# Patient Record
Sex: MALE | Race: ASIAN | Employment: OTHER | URBAN - METROPOLITAN AREA
[De-identification: names, ages, dates, MRNs, and addresses within clinical notes are randomized per-mention and may not be internally consistent; named-entity substitution may affect disease eponyms.]

---

## 2017-09-15 ENCOUNTER — HOSPITAL ENCOUNTER (OUTPATIENT)
Age: 77
Setting detail: OBSERVATION
Discharge: HOME | End: 2017-09-16
Attending: INTERNAL MEDICINE | Admitting: INTERNAL MEDICINE
Payer: OTHER GOVERNMENT

## 2017-09-15 ENCOUNTER — APPOINTMENT (OUTPATIENT)
Dept: GENERAL RADIOLOGY | Age: 77
End: 2017-09-15
Payer: OTHER GOVERNMENT

## 2017-09-15 PROBLEM — I48.92 ATRIAL FLUTTER (HCC): Status: ACTIVE | Noted: 2017-09-15

## 2017-09-15 PROBLEM — R00.1 BRADYCARDIA: Status: ACTIVE | Noted: 2017-09-15

## 2017-09-15 PROBLEM — I10 HTN (HYPERTENSION): Status: ACTIVE | Noted: 2017-09-15

## 2017-09-15 PROBLEM — I25.10 CAD (CORONARY ARTERY DISEASE): Status: ACTIVE | Noted: 2017-09-15

## 2017-09-15 PROBLEM — Z95.1 HX OF CABG: Status: ACTIVE | Noted: 2017-09-15

## 2017-09-15 PROBLEM — R55 NEAR SYNCOPE: Status: ACTIVE | Noted: 2017-09-15

## 2017-09-15 PROBLEM — E11.9 DM (DIABETES MELLITUS) (HCC): Status: ACTIVE | Noted: 2017-09-15

## 2017-09-15 LAB
ALBUMIN SERPL-MCNC: 3.2 G/DL (ref 3.2–4.6)
ALBUMIN/GLOB SERPL: 0.7 {RATIO} (ref 1.2–3.5)
ALP SERPL-CCNC: 76 U/L (ref 50–136)
ALT SERPL-CCNC: 43 U/L (ref 12–65)
ANION GAP SERPL CALC-SCNC: 9 MMOL/L (ref 7–16)
AST SERPL-CCNC: 40 U/L (ref 15–37)
ATRIAL RATE: 227 BPM
ATRIAL RATE: 227 BPM
BASOPHILS # BLD: 0 K/UL (ref 0–0.2)
BASOPHILS NFR BLD: 0 % (ref 0–2)
BILIRUB SERPL-MCNC: 1 MG/DL (ref 0.2–1.1)
BUN SERPL-MCNC: 39 MG/DL (ref 8–23)
CALCIUM SERPL-MCNC: 9 MG/DL (ref 8.3–10.4)
CALCULATED P AXIS, ECG09: -103 DEGREES
CALCULATED R AXIS, ECG10: 59 DEGREES
CALCULATED R AXIS, ECG10: 71 DEGREES
CALCULATED T AXIS, ECG11: 43 DEGREES
CALCULATED T AXIS, ECG11: 67 DEGREES
CHLORIDE SERPL-SCNC: 102 MMOL/L (ref 98–107)
CO2 SERPL-SCNC: 27 MMOL/L (ref 21–32)
CREAT SERPL-MCNC: 1.67 MG/DL (ref 0.8–1.5)
DIAGNOSIS, 93000: NORMAL
DIAGNOSIS, 93000: NORMAL
DIFFERENTIAL METHOD BLD: ABNORMAL
EOSINOPHIL # BLD: 0.3 K/UL (ref 0–0.8)
EOSINOPHIL NFR BLD: 3 % (ref 0.5–7.8)
ERYTHROCYTE [DISTWIDTH] IN BLOOD BY AUTOMATED COUNT: 13.4 % (ref 11.9–14.6)
GLOBULIN SER CALC-MCNC: 4.4 G/DL (ref 2.3–3.5)
GLUCOSE BLD STRIP.AUTO-MCNC: 133 MG/DL (ref 65–100)
GLUCOSE BLD STRIP.AUTO-MCNC: 195 MG/DL (ref 65–100)
GLUCOSE SERPL-MCNC: 146 MG/DL (ref 65–100)
HCT VFR BLD AUTO: 38.9 % (ref 41.1–50.3)
HGB BLD-MCNC: 13.4 G/DL (ref 13.6–17.2)
IMM GRANULOCYTES # BLD: 0.1 K/UL (ref 0–0.5)
IMM GRANULOCYTES NFR BLD: 0.6 % (ref 0–5)
LYMPHOCYTES # BLD: 1.4 K/UL (ref 0.5–4.6)
LYMPHOCYTES NFR BLD: 14 % (ref 13–44)
MCH RBC QN AUTO: 30.5 PG (ref 26.1–32.9)
MCHC RBC AUTO-ENTMCNC: 34.4 G/DL (ref 31.4–35)
MCV RBC AUTO: 88.6 FL (ref 79.6–97.8)
MONOCYTES # BLD: 0.6 K/UL (ref 0.1–1.3)
MONOCYTES NFR BLD: 6 % (ref 4–12)
NEUTS SEG # BLD: 7.9 K/UL (ref 1.7–8.2)
NEUTS SEG NFR BLD: 76 % (ref 43–78)
PLATELET # BLD AUTO: 233 K/UL (ref 150–450)
PMV BLD AUTO: 11.2 FL (ref 10.8–14.1)
POTASSIUM SERPL-SCNC: 4.2 MMOL/L (ref 3.5–5.1)
PROT SERPL-MCNC: 7.6 G/DL (ref 6.3–8.2)
Q-T INTERVAL, ECG07: 394 MS
Q-T INTERVAL, ECG07: 432 MS
QRS DURATION, ECG06: 84 MS
QRS DURATION, ECG06: 86 MS
QTC CALCULATION (BEZET), ECG08: 390 MS
QTC CALCULATION (BEZET), ECG08: 409 MS
RBC # BLD AUTO: 4.39 M/UL (ref 4.23–5.67)
SODIUM SERPL-SCNC: 138 MMOL/L (ref 136–145)
TROPONIN I SERPL-MCNC: 0.13 NG/ML (ref 0.02–0.05)
VENTRICULAR RATE, ECG03: 54 BPM
VENTRICULAR RATE, ECG03: 59 BPM
WBC # BLD AUTO: 10.4 K/UL (ref 4.3–11.1)

## 2017-09-15 PROCEDURE — 94760 N-INVAS EAR/PLS OXIMETRY 1: CPT | Performed by: INTERNAL MEDICINE

## 2017-09-15 PROCEDURE — 82962 GLUCOSE BLOOD TEST: CPT

## 2017-09-15 PROCEDURE — 85025 COMPLETE CBC W/AUTO DIFF WBC: CPT | Performed by: PHYSICIAN ASSISTANT

## 2017-09-15 PROCEDURE — 99218 HC RM OBSERVATION: CPT

## 2017-09-15 PROCEDURE — 93005 ELECTROCARDIOGRAM TRACING: CPT | Performed by: PHYSICIAN ASSISTANT

## 2017-09-15 PROCEDURE — 99284 EMERGENCY DEPT VISIT MOD MDM: CPT | Performed by: INTERNAL MEDICINE

## 2017-09-15 PROCEDURE — 74011636637 HC RX REV CODE- 636/637: Performed by: NURSE PRACTITIONER

## 2017-09-15 PROCEDURE — 71020 XR CHEST PA LAT: CPT

## 2017-09-15 PROCEDURE — 80053 COMPREHEN METABOLIC PANEL: CPT | Performed by: PHYSICIAN ASSISTANT

## 2017-09-15 PROCEDURE — 84484 ASSAY OF TROPONIN QUANT: CPT | Performed by: PHYSICIAN ASSISTANT

## 2017-09-15 PROCEDURE — 93005 ELECTROCARDIOGRAM TRACING: CPT | Performed by: INTERNAL MEDICINE

## 2017-09-15 RX ORDER — GUAIFENESIN/PHENYLPROPANOLAMIN
450 EXPECTORANT ORAL DAILY
COMMUNITY

## 2017-09-15 RX ORDER — INSULIN LISPRO 100 [IU]/ML
INJECTION, SOLUTION INTRAVENOUS; SUBCUTANEOUS
Status: DISCONTINUED | OUTPATIENT
Start: 2017-09-15 | End: 2017-09-16 | Stop reason: HOSPADM

## 2017-09-15 RX ORDER — FUROSEMIDE 40 MG/1
40 TABLET ORAL DAILY
COMMUNITY

## 2017-09-15 RX ORDER — INSULIN ASPART 100 [IU]/ML
4 INJECTION, SOLUTION INTRAVENOUS; SUBCUTANEOUS DAILY
COMMUNITY

## 2017-09-15 RX ORDER — TAMSULOSIN HYDROCHLORIDE 0.4 MG/1
0.4 CAPSULE ORAL DAILY
Status: DISCONTINUED | OUTPATIENT
Start: 2017-09-16 | End: 2017-09-16 | Stop reason: HOSPADM

## 2017-09-15 RX ORDER — ALBUTEROL SULFATE 90 UG/1
2 AEROSOL, METERED RESPIRATORY (INHALATION)
COMMUNITY

## 2017-09-15 RX ORDER — SODIUM CHLORIDE 0.9 % (FLUSH) 0.9 %
5-10 SYRINGE (ML) INJECTION EVERY 8 HOURS
Status: DISCONTINUED | OUTPATIENT
Start: 2017-09-15 | End: 2017-09-16 | Stop reason: HOSPADM

## 2017-09-15 RX ORDER — RANITIDINE HCL 75 MG
75 TABLET ORAL 2 TIMES DAILY
COMMUNITY

## 2017-09-15 RX ORDER — SODIUM CHLORIDE 0.9 % (FLUSH) 0.9 %
5-10 SYRINGE (ML) INJECTION AS NEEDED
Status: DISCONTINUED | OUTPATIENT
Start: 2017-09-15 | End: 2017-09-16 | Stop reason: HOSPADM

## 2017-09-15 RX ORDER — GLUCOSAMINE SULFATE 1500 MG
1000 POWDER IN PACKET (EA) ORAL DAILY
COMMUNITY

## 2017-09-15 RX ORDER — GUAIFENESIN 100 MG/5ML
81 LIQUID (ML) ORAL DAILY
Status: DISCONTINUED | OUTPATIENT
Start: 2017-09-16 | End: 2017-09-15 | Stop reason: SDUPTHER

## 2017-09-15 RX ORDER — METOPROLOL TARTRATE 25 MG/1
25 TABLET, FILM COATED ORAL DAILY
COMMUNITY
End: 2017-09-16

## 2017-09-15 RX ORDER — GUAIFENESIN 100 MG/5ML
81 LIQUID (ML) ORAL DAILY
Status: DISCONTINUED | OUTPATIENT
Start: 2017-09-16 | End: 2017-09-16 | Stop reason: HOSPADM

## 2017-09-15 RX ORDER — HYDROCHLOROTHIAZIDE 12.5 MG/1
12.5 TABLET ORAL DAILY
COMMUNITY

## 2017-09-15 RX ORDER — TAMSULOSIN HYDROCHLORIDE 0.4 MG/1
0.4 CAPSULE ORAL DAILY
COMMUNITY

## 2017-09-15 RX ORDER — SIMVASTATIN 20 MG/1
20 TABLET, FILM COATED ORAL
COMMUNITY

## 2017-09-15 RX ORDER — MORPHINE SULFATE 2 MG/ML
2 INJECTION, SOLUTION INTRAMUSCULAR; INTRAVENOUS
Status: DISCONTINUED | OUTPATIENT
Start: 2017-09-15 | End: 2017-09-16 | Stop reason: HOSPADM

## 2017-09-15 RX ORDER — SIMVASTATIN 20 MG/1
20 TABLET, FILM COATED ORAL
Status: DISCONTINUED | OUTPATIENT
Start: 2017-09-15 | End: 2017-09-16 | Stop reason: HOSPADM

## 2017-09-15 RX ORDER — ALBUTEROL SULFATE 90 UG/1
2 AEROSOL, METERED RESPIRATORY (INHALATION)
Status: DISCONTINUED | OUTPATIENT
Start: 2017-09-15 | End: 2017-09-16 | Stop reason: HOSPADM

## 2017-09-15 RX ORDER — GUAIFENESIN 100 MG/5ML
81 LIQUID (ML) ORAL DAILY
COMMUNITY

## 2017-09-15 RX ADMIN — INSULIN LISPRO 2 UNITS: 100 INJECTION, SOLUTION INTRAVENOUS; SUBCUTANEOUS at 22:00

## 2017-09-15 NOTE — H&P
[facility] Cardiology H&P    Admitting Cardiologist:Dr. Verenice Serrano    Primary Cardiologist:Dr. Jenae Henson    Primary Care Physician:Dr. Michelle Villalta    Subjective:     Beverlyn Gitelman is a 69 y.o.male who presents from Dr. Jenae Henson office today after having stress test. Reportedly pt was on treadmill approximately six minutes in to protocol when he became acutely bradycardic and near syncopal. He required half amp of atropine for reported HR in 20's--tracings are not available at present. Pt denies any chest pain during, prior or presently in ER. His ECG is noted to be atrial flutter with slow ventricular response. No prior hx of atrial arrhythmias to his and his son's knowledge. PMHx CAD, CABG x 5--in 2005 in [place],  HTN DM. His son relates mild cough in last few months, treated by PCP for bronchitis with ABx but no improvement. Placed on diuretics with some improvement. Son thinks that his EF was normal post op--He is an endocrinologist.     Past Medical History:   Diagnosis Date    BPH (benign prostatic hyperplasia)     CRF (chronic renal failure)     Diabetic retinopathy ([address]     Heart failure ([address]     Hyperlipidemia     Hypertension     Hypopotassemia     Left ventricular hypertrophy       Past Surgical History:   Procedure Laterality Date    HX CORONARY ARTERY BYPASS GRAFT      x5      No current facility-administered medications for this encounter. Current Outpatient Prescriptions   Medication Sig    cholecalciferol (VITAMIN D3) 1,000 unit cap Take 1,000 Units by mouth daily.  aspirin 81 mg chewable tablet Take 81 mg by mouth daily.  insulin aspart (NOVOLOG FLEXPEN) 100 unit/mL inpn 4 Units by SubCUTAneous route daily.  SITagliptin (JANUVIA) 100 mg tablet Take 100 mg by mouth daily.  simvastatin (ZOCOR) 20 mg tablet Take 20 mg by mouth nightly.  saw palmetto 500 mg cap Take 450 mg by mouth daily.  tamsulosin (FLOMAX) 0.4 mg capsule Take 0.4 mg by mouth daily.     raNITIdine (ZANTAC 75) 75 mg tablet Take 75 mg by mouth two (2) times a day.  metoprolol tartrate (LOPRESSOR) 25 mg tablet Take 25 mg by mouth daily.  albuterol (PROVENTIL HFA) 90 mcg/actuation inhaler Take 2 Puffs by inhalation every six (6) hours as needed for Wheezing.  hydroCHLOROthiazide (HYDRODIURIL) 12.5 mg tablet Take 12.5 mg by mouth daily.  furosemide (LASIX) 40 mg tablet Take 40 mg by mouth daily. No Known Allergies   Social History   Substance Use Topics    Smoking status: Former Smoker    Smokeless tobacco: Not on file    Alcohol use No          Review of Systems  Gen: Denies fever, chills, malaise or fatigue. Appetite good. HEENT: Denies frequent headaches, dizzyness, visual disturbances, Neck pain or swallowing difficulty  Lungs: Denies shortness of breath, hx of COPD, breathing problems  Cardiovascular: as above   GI: Denies hememesis, dark tarry stools, No prior Hx of GI bleed, Denies constipation  : Denies dysuria, no complaints of frequency, nocturia  Heme: No prior bleeding disorders, no prior Cancer  Neuro: Denies prior CVA, TIA. Endocrine: no diabetes, thyroid disorders  Psychiatric: Denies anxiety, or other psychiatric illnesses. Objective:     Visit Vitals    /46 (BP 1 Location: Right arm, BP Patient Position: At rest)    Pulse (!) 59    Temp 98.2 °F (36.8 °C)    Resp 20    Ht 5' 4\" (1.626 m)    Wt 58.5 kg (129 lb)    SpO2 96%    BMI 22.14 kg/m2     General:Alert, cooperative, no distress, appears stated age  Head: Normocephalic, without obvious abnormality, atraumatic. Eyes: Conjunctivae/corneas clear. PERRL, EOMs intact  Nose:Nares normal. Septum midline. Mucosa normal. No drainage or sinus tenderness. Throat: Lips, mucosa, and tongue normal. Teeth and gums normal.   Neck: Supple, symmetrical, trachea midline,  no carotid bruit and no JVD. Lungs:Clear to auscultation bilaterally. Chest wall: No tenderness or deformity.    Heart: Regular rate and rhythm, S1, S2 normal, no murmur, click, rub or gallop. Abdomen:Soft, non-tender. Bowel sounds normal. No masses, No organomegaly. Extremities: Extremities normal, atraumatic, no cyanosis or edema. Pulses: 2+ and symmetric all extremities. Skin: Skin color, texture, turgor normal. No rashes or lesions  Lymph nodes: Cervical, supraclavicular, and axillary nodes normal  Neurologic:No focal deficits identified                 ECG: atrial flutter with slow vent response. Data Review:     Recent Results (from the past 24 hour(s))   EKG, 12 LEAD, INITIAL    Collection Time: 09/15/17  2:49 PM   Result Value Ref Range    Ventricular Rate 59 BPM    Atrial Rate 227 BPM    QRS Duration 84 ms    Q-T Interval 394 ms    QTC Calculation (Bezet) 390 ms    Calculated R Axis 59 degrees    Calculated T Axis 43 degrees    Diagnosis                Assessment / Plan     Principal Problem:    Near syncope (9/15/2017)--admit to telemetry, check serial enzymes, hold BB, plan for Lexiscan stress in am.     Active Problems:    CAD (coronary artery disease) (9/15/2017)--as above. No recent CP      Hx of CABG (9/15/2017)      HTN (hypertension) (9/15/2017)--stable, monitor closely with hold BB       DM (diabetes mellitus) ([address] (9/15/2017)--home insulin rx       Bradycardia (9/15/2017)--as above      Atrial flutter ([address] (9/15/2017)--new diagnosis of uncertain duration. Will start OA tonight. Eliquis 5mg BID.                Sona Bazan NP

## 2017-09-15 NOTE — ED TRIAGE NOTES
Pt arrive via EMS coming from [facility] getting a stress test. Pt brandon down to 20s per staff at medical center. Pt denies syncope. . /67, HR 58. Pt currently denies chest pain. Denies weakness at this time. Denies dizziness.

## 2017-09-15 NOTE — ED TRIAGE NOTES
Jose Finch is a 69 y.o. male here for pt in office getting stress test at [facility], + brandon event, no loc, on beta blockers, pt was having stress test for routine exam had cabg several years ago no cp or sob. Rapid assessment performed. --- Orders were placed.   --- Patient will be placed in waiting room   Signed By: VIRGINIA Rodriguez     September 15, 2017

## 2017-09-15 NOTE — IP AVS SNAPSHOT
[address] 
[phone] Patient: Fanta Ross MRN: [id number] You are allergic to the following No active allergies Recent Documentation Height Weight BMI Smoking Status 1.638 m 57 kg 21.23 kg/m2 Former Smoker Unresulted Labs Order Current Status LIPID PANEL Collected (09/16/17 0557) METABOLIC PANEL, BASIC Collected (09/16/17 0557) Emergency Contacts Name Discharge Info Relation Home Work Mobile Malathi Tobin  Son [22]   [phone] Itz Castaneda  Spouse [3]   [phone] About your hospitalization You were admitted on:  September 15, 2017 You last received care in the:  [facility] 3 TELEMETRY You were discharged on:  September 16, 2017 Unit phone number:  [phone] Why you were hospitalized Your primary diagnosis was:  Near Syncope Your diagnoses also included:  Cad (Coronary Artery Disease), Hx Of Cabg, Htn (Hypertension), Dm (Diabetes Mellitus) (Hcc), Bradycardia, Atrial Flutter (Hcc) Providers Seen During Your Hospitalizations Provider Role Specialty Primary office phone Joanna Mcduffie MD Attending Provider Cardiology [phone] Your Primary Care Physician (PCP) Primary Care Physician Office Phone Office Fax Claire Palacio [phone] Follow-up Information Follow up With Details Comments Contact Info Crystal Arias MD Schedule an appointment as soon as possible for a visit  [phone] [address] internal medicine and car [facility] [id number] 
[phone] Lorene Delatorre MD  Call Dr Comer Pi for f/u appt  [address] [address] 
[phone] Current Discharge Medication List  
  
START taking these medications Dose & Instructions Dispensing Information Comments Morning Noon Evening Bedtime  
 apixaban 2.5 mg tablet Commonly known as:  Ida Mei Your last dose was: Your next dose is:    
   
   
 Dose:  2.5 mg Take 1 Tab by mouth two (2) times a day. Quantity:  60 Tab Refills:  11 CONTINUE these medications which have NOT CHANGED Dose & Instructions Dispensing Information Comments Morning Noon Evening Bedtime  
 aspirin 81 mg chewable tablet Your last dose was: Your next dose is:    
   
   
 Dose:  81 mg Take 81 mg by mouth daily. Refills:  0  
     
   
   
   
  
 FLOMAX 0.4 mg capsule Generic drug:  tamsulosin Your last dose was: Your next dose is:    
   
   
 Dose:  0.4 mg Take 0.4 mg by mouth daily. Refills:  0  
     
   
   
   
  
 hydroCHLOROthiazide 12.5 mg tablet Commonly known as:  HYDRODIURIL Your last dose was: Your next dose is:    
   
   
 Dose:  12.5 mg Take 12.5 mg by mouth daily. Refills:  0 JANUVIA 100 mg tablet Generic drug:  SITagliptin Your last dose was: Your next dose is:    
   
   
 Dose:  100 mg Take 100 mg by mouth daily. Refills:  0  
     
   
   
   
  
 LASIX 40 mg tablet Generic drug:  furosemide Your last dose was: Your next dose is:    
   
   
 Dose:  40 mg Take 40 mg by mouth daily. Refills:  0 NovoLOG Flexpen 100 unit/mL Inpn Generic drug:  insulin aspart Your last dose was: Your next dose is:    
   
   
 Dose:  4 Units 4 Units by SubCUTAneous route daily. Refills:  0 PROVENTIL HFA 90 mcg/actuation inhaler Generic drug:  albuterol Your last dose was: Your next dose is:    
   
   
 Dose:  2 Puff Take 2 Puffs by inhalation every six (6) hours as needed for Wheezing. Refills:  0  
     
   
   
   
  
 saw palmetto 500 mg Cap Your last dose was: Your next dose is:    
   
   
 Dose:  450 mg Take 450 mg by mouth daily. Refills:  0  
     
   
   
   
  
 VITAMIN D3 1,000 unit Cap Generic drug:  cholecalciferol Your last dose was: Your next dose is:    
   
   
 Dose:  1000 Units Take 1,000 Units by mouth daily. Refills:  0  
     
   
   
   
  
 ZANTAC 75 75 mg tablet Generic drug:  raNITIdine Your last dose was: Your next dose is:    
   
   
 Dose:  75 mg Take 75 mg by mouth two (2) times a day. Refills:  0 ZOCOR 20 mg tablet Generic drug:  simvastatin Your last dose was: Your next dose is:    
   
   
 Dose:  20 mg Take 20 mg by mouth nightly. Refills:  0 STOP taking these medications   
 metoprolol tartrate 25 mg tablet Commonly known as:  LOPRESSOR Where to Get Your Medications Information on where to get these meds will be given to you by the nurse or doctor. ! Ask your nurse or doctor about these medications  
  apixaban 2.5 mg tablet Discharge Instructions Learning About Atrial Fibrillation What is atrial fibrillation? Atrial fibrillation (say \"AY-tree-brad mum-krhy-XKY-shun\") is the most common type of irregular heartbeat (arrhythmia). Normally, the heart beats in a strong, steady rhythm. In atrial fibrillation, a problem with the heart's electrical system causes the two upper parts of the heart (the atria) to quiver, or fibrillate. Your heart rate also may be faster than normal. 
Atrial fibrillation can be dangerous because if the heartbeat isn't strong and steady, blood can collect, or pool, in the atria. And pooled blood is more likely to form clots. Clots can travel to the brain, block blood flow, and cause a stroke. Atrial fibrillation can also lead to heart failure. Treatment for atrial fibrillation helps prevent stroke and heart failure. It also helps relieve symptoms. Atrial fibrillation is often caused by another heart problem. It may happen after heart surgery. It may also be caused by other problems, such as an overactive thyroid gland or lung disease. Many people with atrial fibrillation are able to live full and active lives. What are the symptoms? Some people feel symptoms when they have episodes of atrial fibrillation. But other people don't notice any symptoms. If you have symptoms, you may feel: · A fluttering, racing, or pounding feeling in your chest called palpitations. · Weak or tired. · Dizzy or lightheaded. · Short of breath. · Chest pain. · Confused. You may notice signs of atrial fibrillation when you check your pulse. Your pulse may seem uneven or fast. 
What can you expect when you have atrial fibrillation? At first, spells of atrial fibrillation may come on suddenly and last a short time. It may go away on its own or it goes away after treatment. This is called paroxysmal atrial fibrillation. Over time, the spells may last longer and occur more often. They often don't go away on their own. How is it treated? Treatments can help you feel better and prevent future problems, especially stroke and heart failure. The main types of treatment slow the heart rate, control the heart rhythm, and help prevent stroke. Your treatment will depend on the cause of your atrial fibrillation, your symptoms, and your risk for stroke. · Heart rate treatment. Medicine may be used to slow your heart rate. Your heartbeat may still be irregular. But these medicines keep your heart from beating too fast. They may also help relieve your symptoms. · Heart rhythm treatment. Different treatments may be used to try to stop atrial fibrillation and keep it from returning. They can also relieve symptoms. These treatments include medicine, electrical cardioversion to shock the heart back to a normal rhythm, a procedure called catheter ablation, and heart surgery. · Stroke prevention. You and your doctor can decide how to lower your risk. You may decide to take a blood-thinning medicine, such as aspirin or an anticoagulant. How can you live well with it? You can live well and help manage atrial fibrillation by having a heart-healthy lifestyle. To have a heart-healthy lifestyle: · Don't smoke. · Eat heart-healthy foods. · Be active. Talk to your doctor about what type and level of exercise is safe for you. · Stay at a healthy weight. Lose weight if you need to. · Manage stress. · Avoid alcohol if it triggers symptoms. · Manage other health problems such as high blood pressure, high cholesterol, and diabetes. · Avoid getting sick from the flu. Get a flu shot every year. When should you call for help? Call 911 anytime you think you may need emergency care. For example, call if: 
· You have symptoms of a stroke. These may include: 
¨ Sudden numbness, tingling, weakness, or loss of movement in your face, arm, or leg, especially on only one side of your body. ¨ Sudden vision changes. ¨ Sudden trouble speaking. ¨ Sudden confusion or trouble understanding simple statements. ¨ Sudden problems with walking or balance. ¨ A sudden, severe headache that is different from past headaches. Call your doctor now or seek immediate medical care if: 
· You have new or increased shortness of breath. · You feel dizzy or lightheaded, or you feel like you may faint. Watch closely for changes in your health, and be sure to contact your doctor if you have any problems. Follow-up care is a key part of your treatment and safety. Be sure to make and go to all appointments, and call your doctor if you are having problems. It's also a good idea to know your test results and keep a list of the medicines you take. Where can you learn more? Go to http://ron-orlando.info/. Enter 136-762-8699 in the search box to learn more about \"Learning About Atrial Fibrillation. \" 
 Current as of: March 28, 2016 Content Version: 11.3 © 4293-8434 [employer]. Care instructions adapted under license by [employer] (which disclaims liability or warranty for this information). If you have questions about a medical condition or this instruction, always ask your healthcare professional. [address] any warranty or liability for your use of this information. DISCHARGE SUMMARY from Nurse The following personal items are in your possession at time of discharge: 
 
  
  
  
  
  
  
  
  
 
 
 
 
PATIENT INSTRUCTIONS: 
 
 
F-face looks uneven A-arms unable to move or move unevenly S-speech slurred or non-existent T-time-call 911 as soon as signs and symptoms begin-DO NOT go Back to bed or wait to see if you get better-TIME IS BRAIN. Warning Signs of HEART ATTACK Call 911 if you have these symptoms: 
? Chest discomfort. Most heart attacks involve discomfort in the center of the chest that lasts more than a few minutes, or that goes away and comes back. It can feel like uncomfortable pressure, squeezing, fullness, or pain. ? Discomfort in other areas of the upper body. Symptoms can include pain or discomfort in one or both arms, the back, neck, jaw, or stomach. ? Shortness of breath with or without chest discomfort. ? Other signs may include breaking out in a cold sweat, nausea, or lightheadedness. Don't wait more than five minutes to call [address]! Fast action can save your life. Calling 911 is almost always the fastest way to get lifesaving treatment. Emergency Medical Services staff can begin treatment when they arrive  up to an hour sooner than if someone gets to the hospital by car. The discharge information has been reviewed with the patient. The patient verbalized understanding.  
 
Discharge medications reviewed with the patient and appropriate educational materials and side effects teaching were provided. Discharge Orders None [employer] Announcement We are excited to announce that we are making your provider's discharge notes available to you in [employer]. You will see these notes when they are completed and signed by the physician that discharged you from your recent hospital stay. If you have any questions or concerns about any information you see in [employer], please call the Health Information Department where you were seen or reach out to your Primary Care Provider for more information about your plan of care. Introducing [facility]! Ritika Mullen introduces [employer] patient portal. Now you can access parts of your medical record, email your doctor's office, and request medication refills online. 1. In your internet browser, go to [email or web address] 2. Click on the First Time User? Click Here link in the Sign In box. You will see the New Member Sign Up page. 3. Enter your [employer] Access Code exactly as it appears below. You will not need to use this code after youve completed the sign-up process. If you do not sign up before the expiration date, you must request a new code. · [employer] Access Code: [email or web address] Expires: 12/15/2017  8:55 AM 
 
4. Enter the last four digits of your Social Security Number (xxxx) and Date of Birth (mm/dd/yyyy) as indicated and click Submit. You will be taken to the next sign-up page. 5. Create a [employer] ID. This will be your [employer] login ID and cannot be changed, so think of one that is secure and easy to remember. 6. Create a [employer] password. You can change your password at any time. 7. Enter your Password Reset Question and Answer. This can be used at a later time if you forget your password. 8. Enter your e-mail address. You will receive e-mail notification when new information is available in [address] 9. Click Sign Up. You can now view and download portions of your medical record. 10. Click the Download Summary menu link to download a portable copy of your medical information. If you have questions, please visit the Frequently Asked Questions section of the [employer] website. Remember, [employer] is NOT to be used for urgent needs. For medical emergencies, dial 911. Now available from your iPhone and Android! General Information Please provide this summary of care documentation to your next provider. Patient Signature:  ____________________________________________________________ Date:  ____________________________________________________________  
  
Ronald Matsu Provider Signature:  ____________________________________________________________ Date:  ____________________________________________________________

## 2017-09-16 VITALS
BODY MASS INDEX: 20.93 KG/M2 | RESPIRATION RATE: 18 BRPM | HEIGHT: 65 IN | WEIGHT: 125.6 LBS | DIASTOLIC BLOOD PRESSURE: 64 MMHG | OXYGEN SATURATION: 98 % | TEMPERATURE: 98.1 F | SYSTOLIC BLOOD PRESSURE: 105 MMHG | HEART RATE: 58 BPM

## 2017-09-16 LAB
BASOPHILS # BLD: 0 K/UL (ref 0–0.2)
BASOPHILS NFR BLD: 0 % (ref 0–2)
DIFFERENTIAL METHOD BLD: ABNORMAL
EOSINOPHIL # BLD: 0.3 K/UL (ref 0–0.8)
EOSINOPHIL NFR BLD: 5 % (ref 0.5–7.8)
ERYTHROCYTE [DISTWIDTH] IN BLOOD BY AUTOMATED COUNT: 15.1 % (ref 11.9–14.6)
GLUCOSE BLD STRIP.AUTO-MCNC: 146 MG/DL (ref 65–100)
HCT VFR BLD AUTO: 44.1 % (ref 41.1–50.3)
HGB BLD-MCNC: 15.3 G/DL (ref 13.6–17.2)
IMM GRANULOCYTES # BLD: 0 K/UL (ref 0–0.5)
IMM GRANULOCYTES NFR BLD: 0.8 % (ref 0–5)
LYMPHOCYTES # BLD: 1.7 K/UL (ref 0.5–4.6)
LYMPHOCYTES NFR BLD: 33 % (ref 13–44)
MCH RBC QN AUTO: 30.7 PG (ref 26.1–32.9)
MCHC RBC AUTO-ENTMCNC: 34.7 G/DL (ref 31.4–35)
MCV RBC AUTO: 88.6 FL (ref 79.6–97.8)
MONOCYTES # BLD: 0.4 K/UL (ref 0.1–1.3)
MONOCYTES NFR BLD: 8 % (ref 4–12)
NEUTS SEG # BLD: 2.7 K/UL (ref 1.7–8.2)
NEUTS SEG NFR BLD: 53 % (ref 43–78)
PLATELET # BLD AUTO: 198 K/UL (ref 150–450)
PMV BLD AUTO: ABNORMAL FL (ref 10.8–14.1)
RBC # BLD AUTO: 4.98 M/UL (ref 4.23–5.67)
TROPONIN I SERPL-MCNC: 0.13 NG/ML (ref 0.02–0.05)
WBC # BLD AUTO: 5 K/UL (ref 4.3–11.1)

## 2017-09-16 PROCEDURE — 74011250637 HC RX REV CODE- 250/637: Performed by: NURSE PRACTITIONER

## 2017-09-16 PROCEDURE — 99218 HC RM OBSERVATION: CPT

## 2017-09-16 PROCEDURE — 85025 COMPLETE CBC W/AUTO DIFF WBC: CPT | Performed by: NURSE PRACTITIONER

## 2017-09-16 PROCEDURE — 74011250637 HC RX REV CODE- 250/637: Performed by: PHYSICIAN ASSISTANT

## 2017-09-16 PROCEDURE — 82962 GLUCOSE BLOOD TEST: CPT

## 2017-09-16 PROCEDURE — 84484 ASSAY OF TROPONIN QUANT: CPT | Performed by: NURSE PRACTITIONER

## 2017-09-16 RX ORDER — HYDROCHLOROTHIAZIDE 25 MG/1
25 TABLET ORAL DAILY
Status: DISCONTINUED | OUTPATIENT
Start: 2017-09-16 | End: 2017-09-16 | Stop reason: HOSPADM

## 2017-09-16 RX ADMIN — SIMVASTATIN 20 MG: 20 TABLET, FILM COATED ORAL at 00:53

## 2017-09-16 RX ADMIN — APIXABAN 2.5 MG: 2.5 TABLET, FILM COATED ORAL at 10:37

## 2017-09-16 NOTE — DISCHARGE SUMMARY
[facility] Cardiology Discharge Summary     Patient ID:  Ria Michel  [id number]  69 y.o.  1940    Admit date: 9/15/2017    Discharge date and time: 9/16/2017    Admitting Physician: Adelaida Porter MD     Discharge Physician: VIRGINIA Welch/Dr. Rosa Hutchins    Admission Diagnoses: Near syncope  Atrial flutter [facility])    Discharge Diagnoses:    Diagnosis    CAD (coronary artery disease)    Hx of CABG    HTN (hypertension)    DM (diabetes mellitus) ([facility]    Bradycardia    Atrial flutter ([facility]    Near syncope       Cardiology Procedures this admission:  None  Consults: None    Hospital Course: Pt was admitted with complaints of bradycardia and near syncope after 6 minutes on treadmill for nuclear stress test at Dr April Cordoba office. He was referred to the Er where he was noted to be in slow a flutter. It was felt he may have had a vagal episode. Eliquis was started, metoprolol and hctz held. Pt had no evidence of ventricular arrhythmias on monitor. No CP or SOB. He had no further pre-syncope or syncopal symptoms. Troponin minimally elevated at . 13. Dr Rosa Hutchins discussed findings and options for further evaluation with patient and son. They will f/u with Dr Jasmin Puente to discuss completing nuclear stress test in outpt setting. BP elevated and hctz restarted though BB remains on hold- HR in the mid- 50's. They are to call Dr Jasmin Puente to arrange f/u. Call or return to ER if recurrent syncopal symptoms, CP or SOB. Will start Eliquis for thrombus protection with slow flutter. Indication for treatment and risk of dual antiplatelet therapy was discussed with the patient and he understands to seek medical care immediately if any signs of bleeding.   Pt and son given copies of rhythm strip and EKG as well as d/c summary. DISPOSITION: The patient is being discharged home with son on a low saturated fat, low cholesterol diet. Pt is instructed to advance activities as tolerated.  Pt is instructed to call office or return to ER for immediate evaluation of any shortness of breath or chest pain not relieved by NTG. Discharge Exam:   Visit Vitals    /73 (BP 1 Location: Left arm, BP Patient Position: Head of bed elevated (Comment degrees))  Comment (BP Patient Position): 20 degrees    Pulse 60    Temp 98.3 °F (36.8 °C)    Resp 18    Ht 5' 4.5\" (1.638 m)    Wt 57 kg (125 lb 9.6 oz)    SpO2 98%    BMI 21.23 kg/m2     Patient seen and examined by Dr Emili Hampton- please see their note for further details. Recent Results (from the past 24 hour(s))   EKG, 12 LEAD, INITIAL    Collection Time: 09/15/17  2:49 PM   Result Value Ref Range    Ventricular Rate 59 BPM    Atrial Rate 227 BPM    QRS Duration 84 ms    Q-T Interval 394 ms    QTC Calculation (Bezet) 390 ms    Calculated R Axis 59 degrees    Calculated T Axis 43 degrees    Diagnosis       atrial flutter 3:1 conduction   Abnormal ECG  No previous ECGs available  Confirmed by CASSANDRA MEDINA ()SARAH ([id number]) on 9/15/2017 5:04:26 PM     EKG, 12 LEAD, INITIAL    Collection Time: 09/15/17  3:46 PM   Result Value Ref Range    Ventricular Rate 54 BPM    Atrial Rate 227 BPM    QRS Duration 86 ms    Q-T Interval 432 ms    QTC Calculation (Bezet) 409 ms    Calculated P Axis -103 degrees    Calculated R Axis 71 degrees    Calculated T Axis 67 degrees    Diagnosis       !! AGE AND GENDER SPECIFIC ECG ANALYSIS !!   Atrial flutter with variable A-V block  Abnormal ECG  When compared with ECG of 15-SEP-2017 14:49,  Atrial flutter has replaced Junctional rhythm  Confirmed by Prince Lyons MD (), Birdie Blevins ([id number]) on 9/15/2017 6:34:10 PM     TROPONIN I    Collection Time: 09/15/17  4:25 PM   Result Value Ref Range    Troponin-I, Qt. 0.13 (HH) 0.02 - 0.05 NG/ML   CBC WITH AUTOMATED DIFF    Collection Time: 09/15/17  4:25 PM   Result Value Ref Range    WBC 10.4 4.3 - 11.1 K/uL    RBC 4.39 4.23 - 5.67 M/uL    HGB 13.4 (L) 13.6 - 17.2 g/dL    HCT 38.9 (L) 41.1 - 50.3 %    MCV 88.6 79.6 - 97.8 FL    MCH 30.5 26.1 - 32.9 PG    MCHC 34.4 31.4 - 35.0 g/dL    RDW 13.4 11.9 - 14.6 %    PLATELET 198 811 - 827 K/uL    MPV 11.2 10.8 - 14.1 FL    DF AUTOMATED      NEUTROPHILS 76 43 - 78 %    LYMPHOCYTES 14 13 - 44 %    MONOCYTES 6 4.0 - 12.0 %    EOSINOPHILS 3 0.5 - 7.8 %    BASOPHILS 0 0.0 - 2.0 %    IMMATURE GRANULOCYTES 0.6 0.0 - 5.0 %    ABS. NEUTROPHILS 7.9 1.7 - 8.2 K/UL    ABS. LYMPHOCYTES 1.4 0.5 - 4.6 K/UL    ABS. MONOCYTES 0.6 0.1 - 1.3 K/UL    ABS. EOSINOPHILS 0.3 0.0 - 0.8 K/UL    ABS. BASOPHILS 0.0 0.0 - 0.2 K/UL    ABS. IMM. GRANS. 0.1 0.0 - 0.5 K/UL   METABOLIC PANEL, COMPREHENSIVE    Collection Time: 09/15/17  4:25 PM   Result Value Ref Range    Sodium 138 136 - 145 mmol/L    Potassium 4.2 3.5 - 5.1 mmol/L    Chloride 102 98 - 107 mmol/L    CO2 27 21 - 32 mmol/L    Anion gap 9 7 - 16 mmol/L    Glucose 146 (H) 65 - 100 mg/dL    BUN 39 (H) 8 - 23 MG/DL    Creatinine 1.67 (H) 0.8 - 1.5 MG/DL    GFR est AA 52 (L) >60 ml/min/1.73m2    GFR est non-AA 43 (L) >60 ml/min/1.73m2    Calcium 9.0 8.3 - 10.4 MG/DL    Bilirubin, total 1.0 0.2 - 1.1 MG/DL    ALT (SGPT) 43 12 - 65 U/L    AST (SGOT) 40 (H) 15 - 37 U/L    Alk.  phosphatase 76 50 - 136 U/L    Protein, total 7.6 6.3 - 8.2 g/dL    Albumin 3.2 3.2 - 4.6 g/dL    Globulin 4.4 (H) 2.3 - 3.5 g/dL    A-G Ratio 0.7 (L) 1.2 - 3.5     GLUCOSE, POC    Collection Time: 09/15/17  4:59 PM   Result Value Ref Range    Glucose (POC) 133 (H) 65 - 100 mg/dL   GLUCOSE, POC    Collection Time: 09/15/17 10:51 PM   Result Value Ref Range    Glucose (POC) 195 (H) 65 - 100 mg/dL   TROPONIN I    Collection Time: 09/16/17 12:54 AM   Result Value Ref Range    Troponin-I, Qt. 0.13 (HH) 0.02 - 0.05 NG/ML   CBC WITH AUTOMATED DIFF    Collection Time: 09/16/17  5:40 AM   Result Value Ref Range    WBC 5.0 4.3 - 11.1 K/uL    RBC 4.98 4.23 - 5.67 M/uL    HGB 15.3 13.6 - 17.2 g/dL    HCT 44.1 41.1 - 50.3 %    MCV 88.6 79.6 - 97.8 FL    MCH 30.7 26.1 - 32.9 PG    MCHC 34.7 31.4 - 35.0 g/dL    RDW 15.1 (H) 11.9 - 14.6 %    PLATELET 973 982 - 476 K/uL    MPV Cannot be calculated 10.8 - 14.1 FL    DF AUTOMATED      NEUTROPHILS 53 43 - 78 %    LYMPHOCYTES 33 13 - 44 %    MONOCYTES 8 4.0 - 12.0 %    EOSINOPHILS 5 0.5 - 7.8 %    BASOPHILS 0 0.0 - 2.0 %    IMMATURE GRANULOCYTES 0.8 0.0 - 5.0 %    ABS. NEUTROPHILS 2.7 1.7 - 8.2 K/UL    ABS. LYMPHOCYTES 1.7 0.5 - 4.6 K/UL    ABS. MONOCYTES 0.4 0.1 - 1.3 K/UL    ABS. EOSINOPHILS 0.3 0.0 - 0.8 K/UL    ABS. BASOPHILS 0.0 0.0 - 0.2 K/UL    ABS. IMM. GRANS. 0.0 0.0 - 0.5 K/UL   GLUCOSE, POC    Collection Time: 09/16/17  6:45 AM   Result Value Ref Range    Glucose (POC) 146 (H) 65 - 100 mg/dL         Patient Instructions:   Current Discharge Medication List      START taking these medications    Details   apixaban (ELIQUIS) 5 mg tablet Take 1 Tab by mouth two (2) times a day. Qty: 60 Tab, Refills: 11         CONTINUE these medications which have NOT CHANGED    Details   cholecalciferol (VITAMIN D3) 1,000 unit cap Take 1,000 Units by mouth daily. aspirin 81 mg chewable tablet Take 81 mg by mouth daily. insulin aspart (NOVOLOG FLEXPEN) 100 unit/mL inpn 4 Units by SubCUTAneous route daily. SITagliptin (JANUVIA) 100 mg tablet Take 100 mg by mouth daily. simvastatin (ZOCOR) 20 mg tablet Take 20 mg by mouth nightly. saw palmetto 500 mg cap Take 450 mg by mouth daily. tamsulosin (FLOMAX) 0.4 mg capsule Take 0.4 mg by mouth daily. raNITIdine (ZANTAC 75) 75 mg tablet Take 75 mg by mouth two (2) times a day. albuterol (PROVENTIL HFA) 90 mcg/actuation inhaler Take 2 Puffs by inhalation every six (6) hours as needed for Wheezing. hydroCHLOROthiazide (HYDRODIURIL) 12.5 mg tablet Take 12.5 mg by mouth daily. furosemide (LASIX) 40 mg tablet Take 40 mg by mouth daily.          STOP taking these medications       metoprolol tartrate (LOPRESSOR) 25 mg tablet Comments:   Reason for Stopping:                 Signed:  Evon Cobos JENNIE Staton  9/16/2017  8:28 AM

## 2017-09-16 NOTE — PROGRESS NOTES
Discharge completed. Iv's and heart monitor removed. Patient requested to take first dose of new eliquis rx before discharged. Orders received for eliquis 5 mg BID from Braulio Berg RN. Patient and family had no questions or concerns. Awaiting pharmacy to approve eliquis, so patient can take first dose before leaving.

## 2017-09-16 NOTE — DISCHARGE SUMMARY
ADDENDUM:    Due to weight and cr patient's eliquis dose was decreased to 2.5 mg BID. Current Discharge Medication List      START taking these medications    Details   apixaban (ELIQUIS) 2.5 mg tablet Take 1 Tab by mouth two (2) times a day. Qty: 60 Tab, Refills: 11         CONTINUE these medications which have NOT CHANGED    Details   cholecalciferol (VITAMIN D3) 1,000 unit cap Take 1,000 Units by mouth daily. aspirin 81 mg chewable tablet Take 81 mg by mouth daily. insulin aspart (NOVOLOG FLEXPEN) 100 unit/mL inpn 4 Units by SubCUTAneous route daily. SITagliptin (JANUVIA) 100 mg tablet Take 100 mg by mouth daily. simvastatin (ZOCOR) 20 mg tablet Take 20 mg by mouth nightly. saw palmetto 500 mg cap Take 450 mg by mouth daily. tamsulosin (FLOMAX) 0.4 mg capsule Take 0.4 mg by mouth daily. raNITIdine (ZANTAC 75) 75 mg tablet Take 75 mg by mouth two (2) times a day. albuterol (PROVENTIL HFA) 90 mcg/actuation inhaler Take 2 Puffs by inhalation every six (6) hours as needed for Wheezing. hydroCHLOROthiazide (HYDRODIURIL) 12.5 mg tablet Take 12.5 mg by mouth daily. furosemide (LASIX) 40 mg tablet Take 40 mg by mouth daily.          STOP taking these medications       metoprolol tartrate (LOPRESSOR) 25 mg tablet Comments:   Reason for Stopping:

## 2017-09-16 NOTE — DISCHARGE INSTRUCTIONS
Learning About Atrial Fibrillation  What is atrial fibrillation? Atrial fibrillation (say \"AY-tree-brad kvu-yjiq-ARQ-shun\") is the most common type of irregular heartbeat (arrhythmia). Normally, the heart beats in a strong, steady rhythm. In atrial fibrillation, a problem with the heart's electrical system causes the two upper parts of the heart (the atria) to quiver, or fibrillate. Your heart rate also may be faster than normal.  Atrial fibrillation can be dangerous because if the heartbeat isn't strong and steady, blood can collect, or pool, in the atria. And pooled blood is more likely to form clots. Clots can travel to the brain, block blood flow, and cause a stroke. Atrial fibrillation can also lead to heart failure. Treatment for atrial fibrillation helps prevent stroke and heart failure. It also helps relieve symptoms. Atrial fibrillation is often caused by another heart problem. It may happen after heart surgery. It may also be caused by other problems, such as an overactive thyroid gland or lung disease. Many people with atrial fibrillation are able to live full and active lives. What are the symptoms? Some people feel symptoms when they have episodes of atrial fibrillation. But other people don't notice any symptoms. If you have symptoms, you may feel:  · A fluttering, racing, or pounding feeling in your chest called palpitations. · Weak or tired. · Dizzy or lightheaded. · Short of breath. · Chest pain. · Confused. You may notice signs of atrial fibrillation when you check your pulse. Your pulse may seem uneven or fast.  What can you expect when you have atrial fibrillation? At first, spells of atrial fibrillation may come on suddenly and last a short time. It may go away on its own or it goes away after treatment. This is called paroxysmal atrial fibrillation. Over time, the spells may last longer and occur more often. They often don't go away on their own.   How is it treated? Treatments can help you feel better and prevent future problems, especially stroke and heart failure. The main types of treatment slow the heart rate, control the heart rhythm, and help prevent stroke. Your treatment will depend on the cause of your atrial fibrillation, your symptoms, and your risk for stroke. · Heart rate treatment. Medicine may be used to slow your heart rate. Your heartbeat may still be irregular. But these medicines keep your heart from beating too fast. They may also help relieve your symptoms. · Heart rhythm treatment. Different treatments may be used to try to stop atrial fibrillation and keep it from returning. They can also relieve symptoms. These treatments include medicine, electrical cardioversion to shock the heart back to a normal rhythm, a procedure called catheter ablation, and heart surgery. · Stroke prevention. You and your doctor can decide how to lower your risk. You may decide to take a blood-thinning medicine, such as aspirin or an anticoagulant. How can you live well with it? You can live well and help manage atrial fibrillation by having a heart-healthy lifestyle. To have a heart-healthy lifestyle:  · Don't smoke. · Eat heart-healthy foods. · Be active. Talk to your doctor about what type and level of exercise is safe for you. · Stay at a healthy weight. Lose weight if you need to. · Manage stress. · Avoid alcohol if it triggers symptoms. · Manage other health problems such as high blood pressure, high cholesterol, and diabetes. · Avoid getting sick from the flu. Get a flu shot every year. When should you call for help? Call 911 anytime you think you may need emergency care. For example, call if:  · You have symptoms of a stroke. These may include:  ¨ Sudden numbness, tingling, weakness, or loss of movement in your face, arm, or leg, especially on only one side of your body. ¨ Sudden vision changes. ¨ Sudden trouble speaking.   ¨ Sudden confusion or trouble understanding simple statements. ¨ Sudden problems with walking or balance. ¨ A sudden, severe headache that is different from past headaches. Call your doctor now or seek immediate medical care if:  · You have new or increased shortness of breath. · You feel dizzy or lightheaded, or you feel like you may faint. Watch closely for changes in your health, and be sure to contact your doctor if you have any problems. Follow-up care is a key part of your treatment and safety. Be sure to make and go to all appointments, and call your doctor if you are having problems. It's also a good idea to know your test results and keep a list of the medicines you take. Where can you learn more? Go to http://ron-orlando.info/. Enter [phone] in the search box to learn more about \"Learning About Atrial Fibrillation. \"  Current as of: March 28, 2016  Content Version: 11.3  © 8150-0682 [employer]. Care instructions adapted under license by [employer] (which disclaims liability or warranty for this information). If you have questions about a medical condition or this instruction, always ask your healthcare professional. [place] [address] any warranty or liability for your use of this information. DISCHARGE SUMMARY from Nurse    The following personal items are in your possession at time of discharge:                                    PATIENT INSTRUCTIONS:    After general anesthesia or intravenous sedation, for 24 hours or while taking prescription Narcotics:  · Limit your activities  · Do not drive and operate hazardous machinery  · Do not make important personal or business decisions  · Do  not drink alcoholic beverages  · If you have not urinated within 8 hours after discharge, please contact your surgeon on call.     Report the following to your surgeon:  · Excessive pain, swelling, redness or odor of or around the surgical area  · Temperature over 100.5  · Nausea and vomiting lasting longer than 4 hours or if unable to take medications  · Any signs of decreased circulation or nerve impairment to extremity: change in color, persistent  numbness, tingling, coldness or increase pain  · Any questions          *  Please give a list of your current medications to your Primary Care Provider. *  Please update this list whenever your medications are discontinued, doses are      changed, or new medications (including over-the-counter products) are added. *  Please carry medication information at all times in case of emergency situations. These are general instructions for a healthy lifestyle:    No smoking/ No tobacco products/ Avoid exposure to second hand smoke    Surgeon General's Warning:  Quitting smoking now greatly reduces serious risk to your health. Obesity, smoking, and sedentary lifestyle greatly increases your risk for illness    A healthy diet, regular physical exercise & weight monitoring are important for maintaining a healthy lifestyle    You may be retaining fluid if you have a history of heart failure or if you experience any of the following symptoms:  Weight gain of 3 pounds or more overnight or 5 pounds in a week, increased swelling in our hands or feet or shortness of breath while lying flat in bed. Please call your doctor as soon as you notice any of these symptoms; do not wait until your next office visit. Recognize signs and symptoms of STROKE:    F-face looks uneven    A-arms unable to move or move unevenly    S-speech slurred or non-existent    T-time-call 911 as soon as signs and symptoms begin-DO NOT go       Back to bed or wait to see if you get better-TIME IS BRAIN. Warning Signs of HEART ATTACK     Call 911 if you have these symptoms:   Chest discomfort. Most heart attacks involve discomfort in the center of the chest that lasts more than a few minutes, or that goes away and comes back.  It can feel like uncomfortable pressure, squeezing, fullness, or pain.  Discomfort in other areas of the upper body. Symptoms can include pain or discomfort in one or both arms, the back, neck, jaw, or stomach.  Shortness of breath with or without chest discomfort.  Other signs may include breaking out in a cold sweat, nausea, or lightheadedness. Don't wait more than five minutes to call 911 - MINUTES MATTER! Fast action can save your life. Calling 911 is almost always the fastest way to get lifesaving treatment. Emergency Medical Services staff can begin treatment when they arrive -- up to an hour sooner than if someone gets to the hospital by car. The discharge information has been reviewed with the patient. The patient verbalized understanding. Discharge medications reviewed with the patient and appropriate educational materials and side effects teaching were provided.

## 2017-09-16 NOTE — PROGRESS NOTES
Bedside and Verbal shift change report given to Uyen Lea RN (oncoming nurse) by self (offgoing nurse). Report included the following information SBAR, Kardex, MAR and Recent Results.

## 2017-09-16 NOTE — PROGRESS NOTES
TRANSFER - IN REPORT:    Verbal report received from Marychuy Alejandro RN(name) on [facility]  being received from ED(unit) for routine progression of care      Report consisted of patients Situation, Background, Assessment and   Recommendations(SBAR). Information from the following report(s) SBAR and ED Summary was reviewed with the receiving nurse. Opportunity for questions and clarification was provided. Assessment completed upon patients arrival to unit and care assumed.

## 2017-09-16 NOTE — PROGRESS NOTES
9/16/2017 6:58 AM    Admit Date: 9/15/2017    Admit Diagnosis: Near syncope;Atrial flutter ([facility]      Subjective:    Patient sent here after having a vagal or heart block episode after a stress test. BP and pulse dropped after coming off treadmill. Has underlying rate controlled flutter. No chest pain during any of this. Was to get the imaging portion of stress test today, but pt wishes to go home and defer further workup to outpatient. Trop likely due to the HR of 20 during vagal episode. Offered inpatient workup but he is wanting to go home.     Objective:      Visit Vitals    /73 (BP 1 Location: Left arm, BP Patient Position: Head of bed elevated (Comment degrees))  Comment (BP Patient Position): 20 degrees    Pulse 60    Temp 98.3 °F (36.8 °C)    Resp 18    Ht 5' 4.5\" (1.638 m)    Wt 57 kg (125 lb 9.6 oz)    SpO2 98%    BMI 21.23 kg/m2       ROS:  General ROS: negative for - chills  Hematological and Lymphatic ROS: negative for - blood clots or jaundice  Respiratory ROS: no cough, shortness of breath, or wheezing  Cardiovascular ROS: no chest pain or dyspnea on exertion  Gastrointestinal ROS: no abdominal pain, change in bowel habits, or black or bloody stools  Neurological ROS: no TIA or stroke symptoms    Physical Exam:    Physical Examination: General appearance - alert, well appearing, and in no distress  Mental status - alert, oriented to person, place, and time  Eyes - pupils equal and reactive, extraocular eye movements intact  Neck/lymph - supple, no significant adenopathy  Chest/CV - clear to auscultation, no wheezes, rales or rhonchi, symmetric air entry  Heart - normal rate, regular rhythm, normal S1, S2, no murmurs, rubs, clicks or gallops  Abdomen/GI - soft, nontender, nondistended, no masses or organomegaly  Musculoskeletal - no joint tenderness, deformity or swelling  Extremities - peripheral pulses normal, no pedal edema, no clubbing or cyanosis  Skin - normal coloration and turgor, no rashes, no suspicious skin lesions noted    Current Facility-Administered Medications   Medication Dose Route Frequency    albuterol (PROVENTIL HFA, VENTOLIN HFA, PROAIR HFA) inhaler 2 Puff  2 Puff Inhalation Q6H PRN    simvastatin (ZOCOR) tablet 20 mg  20 mg Oral QHS    SITagliptin (JANUVIA) tablet 50 mg  50 mg Oral DAILY    tamsulosin (FLOMAX) capsule 0.4 mg  0.4 mg Oral DAILY    insulin lispro (HUMALOG) injection   SubCUTAneous AC&HS    sodium chloride (NS) flush 5-10 mL  5-10 mL IntraVENous Q8H    sodium chloride (NS) flush 5-10 mL  5-10 mL IntraVENous PRN    aspirin chewable tablet 81 mg  81 mg Oral DAILY    morphine injection 2 mg  2 mg IntraVENous Q4H PRN       Data Review:   @LABRCNT(Na,K,BUN,CREA,WBC,HGB,HCT,PLT,INR,TRP,TCHOL*,Triglyceride*,LDL*,LDLCPOC HDL*,HDL])@    TELEMETRY: aflutter    Assessment/Plan:     Principal Problem:    Near syncope (9/15/2017)    Active Problems:    CAD (coronary artery disease) (9/15/2017)The current medical regimen is effective;  continue present plan and medications. Hx of CABG (9/15/2017)      HTN (hypertension) (9/15/2017) The current medical regimen is effective;  continue present plan and medications. DM (diabetes mellitus) ([facility] (9/15/2017)The current medical regimen is effective;  continue present plan and medications.         Bradycardia (9/15/2017)      Atrial flutter ([facility] (9/15/2017)          Ulisses Cooney MD